# Patient Record
(demographics unavailable — no encounter records)

---

## 2025-01-02 NOTE — HISTORY OF PRESENT ILLNESS
[de-identified] : YON SAEED is a 21 year old male with no significant PMH who presents today w/complaints of scleral icterus. Pt reports noticing it about 3 weeks ago, lasted a few days then resolved spontaneously. He states he went to his PCP who bradley labs which showed elevated LFTs which then resolved on repeat. Lab reports not available to review. Denies recent infection, abdominal pain or distension, hematemesis, hematochezia, dark urine, confusion or unintentional weight loss. Denies significant alcohol consumption or otc/herbal supplements. Denies family history of liver disease. Smokes marijuana occasionally.

## 2025-01-02 NOTE — ASSESSMENT
[FreeTextEntry1] : YON SAEED is a 21 year old male with no significant PMH who presents today w/complaints of scleral icterus.  Elevated LFTs, scleral icterus -scleral icterus resolved, LFTs normalized per pt. Pt is asymptomatic. -will request labs from PCP -labs ordered to r/o competing etiologies of liver disease -US Abdo ordered to r/o CBD dilatation, cholelithiasis -follow up in 3-4 weeks, sooner if needed